# Patient Record
Sex: FEMALE | Race: WHITE
[De-identification: names, ages, dates, MRNs, and addresses within clinical notes are randomized per-mention and may not be internally consistent; named-entity substitution may affect disease eponyms.]

---

## 2017-12-06 ENCOUNTER — HOSPITAL ENCOUNTER (EMERGENCY)
Dept: HOSPITAL 10 - FTE | Age: 77
Discharge: HOME | End: 2017-12-06
Payer: MEDICARE

## 2017-12-06 VITALS — HEIGHT: 55 IN | WEIGHT: 150.31 LBS | BODY MASS INDEX: 34.79 KG/M2

## 2017-12-06 VITALS — SYSTOLIC BLOOD PRESSURE: 98 MMHG | RESPIRATION RATE: 20 BRPM | HEART RATE: 77 BPM | DIASTOLIC BLOOD PRESSURE: 50 MMHG

## 2017-12-06 DIAGNOSIS — I10: ICD-10-CM

## 2017-12-06 DIAGNOSIS — R19.7: Primary | ICD-10-CM

## 2017-12-06 DIAGNOSIS — Z79.82: ICD-10-CM

## 2017-12-06 DIAGNOSIS — J44.9: ICD-10-CM

## 2017-12-06 DIAGNOSIS — I50.9: ICD-10-CM

## 2017-12-06 DIAGNOSIS — J45.909: ICD-10-CM

## 2017-12-06 LAB
ADD UMIC: YES
ALBUMIN SERPL-MCNC: 3.8 G/DL (ref 3.3–4.9)
ALBUMIN/GLOB SERPL: 1.08 {RATIO}
ALP SERPL-CCNC: 79 IU/L (ref 42–121)
ALT SERPL-CCNC: 44 IU/L (ref 13–69)
ANION GAP SERPL CALC-SCNC: 14 MMOL/L (ref 8–16)
AST SERPL-CCNC: 24 IU/L (ref 15–46)
BASOPHILS # BLD AUTO: 0 10^3/UL (ref 0–0.1)
BASOPHILS NFR BLD: 0.3 % (ref 0–2)
BILIRUB DIRECT SERPL-MCNC: 0 MG/DL (ref 0–0.2)
BILIRUB SERPL-MCNC: 0.2 MG/DL (ref 0.2–1.3)
BUN SERPL-MCNC: 10 MG/DL (ref 7–20)
CALCIUM SERPL-MCNC: 6.7 MG/DL (ref 8.4–10.2)
CHLORIDE SERPL-SCNC: 104 MMOL/L (ref 97–110)
CO2 SERPL-SCNC: 28 MMOL/L (ref 21–31)
COLOR UR: (no result)
CREAT SERPL-MCNC: 0.46 MG/DL (ref 0.44–1)
EOSINOPHIL # BLD: 0.1 10^3/UL (ref 0–0.5)
EOSINOPHIL NFR BLD: 2.1 % (ref 0–7)
ERYTHROCYTE [DISTWIDTH] IN BLOOD BY AUTOMATED COUNT: 14 % (ref 11.5–14.5)
GLOBULIN SER-MCNC: 3.5 G/DL (ref 1.3–3.2)
GLUCOSE SERPL-MCNC: 100 MG/DL (ref 70–220)
GLUCOSE UR STRIP-MCNC: NEGATIVE MG/DL
HCT VFR BLD CALC: 36.3 % (ref 37–47)
HGB BLD-MCNC: 11.4 G/DL (ref 12–16)
KETONES UR STRIP.AUTO-MCNC: NEGATIVE MG/DL
LYMPHOCYTES # BLD AUTO: 2.1 10^3/UL (ref 0.8–2.9)
LYMPHOCYTES NFR BLD AUTO: 31.9 % (ref 15–51)
MCH RBC QN AUTO: 29.5 PG (ref 29–33)
MCHC RBC AUTO-ENTMCNC: 31.4 G/DL (ref 32–37)
MCV RBC AUTO: 93.8 FL (ref 82–101)
MONOCYTES # BLD: 0.6 10^3/UL (ref 0.3–0.9)
MONOCYTES NFR BLD: 8.7 % (ref 0–11)
NEUTROPHILS # BLD: 3.8 10^3/UL (ref 1.6–7.5)
NEUTROPHILS NFR BLD AUTO: 56.7 % (ref 39–77)
NITRITE UR QL STRIP.AUTO: NEGATIVE MG/DL
NRBC # BLD MANUAL: 0 10^3/UL (ref 0–0)
NRBC BLD AUTO-RTO: 0 /100WBC (ref 0–0)
PLATELET # BLD: 363 10^3/UL (ref 140–415)
PMV BLD AUTO: 10.3 FL (ref 7.4–10.4)
POTASSIUM SERPL-SCNC: 4 MMOL/L (ref 3.5–5.1)
PROT SERPL-MCNC: 7.3 G/DL (ref 6.1–8.1)
RBC # BLD AUTO: 3.87 10^6/UL (ref 4.2–5.4)
RBC # UR AUTO: (no result) MG/DL
SODIUM SERPL-SCNC: 142 MMOL/L (ref 135–144)
UR ASCORBIC ACID: NEGATIVE MG/DL
UR BILIRUBIN (DIP): NEGATIVE MG/DL
UR CLARITY: CLEAR
UR PH (DIP): 6 (ref 5–9)
UR RBC: 0 /HPF (ref 0–5)
UR SPECIFIC GRAVITY (DIP): 1 (ref 1–1.03)
UR TOTAL PROTEIN (DIP): NEGATIVE MG/DL
UROBILINOGEN UR STRIP-ACNC: NEGATIVE MG/DL
WBC # BLD AUTO: 6.7 10^3/UL (ref 4.8–10.8)
WBC # UR STRIP: NEGATIVE LEU/UL

## 2017-12-06 PROCEDURE — 85025 COMPLETE CBC W/AUTO DIFF WBC: CPT

## 2017-12-06 PROCEDURE — 81001 URINALYSIS AUTO W/SCOPE: CPT

## 2017-12-06 PROCEDURE — 83690 ASSAY OF LIPASE: CPT

## 2017-12-06 PROCEDURE — 87177 OVA AND PARASITES SMEARS: CPT

## 2017-12-06 PROCEDURE — 99284 EMERGENCY DEPT VISIT MOD MDM: CPT

## 2017-12-06 PROCEDURE — 80053 COMPREHEN METABOLIC PANEL: CPT

## 2017-12-06 PROCEDURE — 36415 COLL VENOUS BLD VENIPUNCTURE: CPT

## 2017-12-06 PROCEDURE — 87045 FECES CULTURE AEROBIC BACT: CPT

## 2017-12-06 NOTE — ERD
ER Documentation


Chief Complaint


Chief Complaint


diarrhea





HPI


77-year-old female presents with diarrhea for last 5 days.  Is intermittent and 

watery and is actually improving today.  There is no history of blood or mucus 

or vomiting or fevers.  She has some mild intermittent crampy left-sided 

abdominal pain.  There is no history of foreign travel or recent antibiotic use.





ROS


All systems reviewed and are negative except as per history of present illness.





Medications


Home Meds


Active Scripts


Bismuth Subsalicylate* (Pepto-Bismol*) 262 Mg/15 Ml Oral.susp, 15 ML PO Q3H Y 

for DIARRHEA for 4 Days, ML


   Prov:TULIO BERTRAND MD         12/6/17


Metoprolol Tartrate* (Lopressor*) 50 Mg Tab, 50 MG PO BID for 30 Days, TAB


   Prov:MUNA GONZALEZ MD         10/30/16


Reported Medications


Meloxicam* (Meloxicam*) 7.5 Mg/5 Ml Oral.susp, 15 MG PO DAILY, #300 ML


   10/27/16


Zolpidem Tartrate* (Zolpidem Tartrate*) 5 Mg Tablet, 5 MG PO QHS Y for INSOMNIA

, #30 TAB


   10/27/16


Potassium Bicarbonate-Citric Acid (Effer-K) 10 Meq Tablet.eff, 10 MEQ PO DAILY, 

TAB


   10/27/16


Amlodipine Besylate* (Norvasc*) 5 Mg Tablet, 5 MG PO DAILY, TAB


   10/27/16


Furosemide* (Furosemide*) 40 Mg Tablet, 40 MG PO DAILY, TAB


   10/27/16


Albuterol Sulfate* (Ventolin HFA*) 18 Gm Hfa.aer.ad, 2 PUFF INHALATION Q4H, #1 

INHALER


   10/27/16


Aspirin* (Aspirin* EC) 81 Mg Tablet.dr, 81 MG PO DAILY, TAB


   10/27/16


Dicyclomine Hcl* (Bentyl*) 10 Mg Capsule, 10 MG PO DAILY, CAP


   10/27/16


Meclizine Hcl* (Meclizine Hcl*) 25 Mg Tablet, 25 MG PO TID, TAB


   10/27/16


Ranitidine Hcl* (Ranitidine Hcl*) 150 Mg Tablet, 150 MG PO HS, #30 TAB


   10/27/16


Omeprazole* (Omeprazole*) 20 Mg Capsule.dr, 20 MG PO DAILY, #30 CAP


   10/27/16





Allergies


Allergies:  


Coded Allergies:  


     No Known Allergy (Unverified , 10/27/16)





PMhx/Soc


Medical and Surgical Hx:  pt denies Medical Hx, pt denies Surgical Hx


History of Surgery:  No


Anesthesia Reaction:  No


Hx Neurological Disorder:  No


Hx Respiratory Disorders:  Yes (asthma)


Hx Cardiac Disorders:  Yes (htn)


Hx Psychiatric Problems:  No


Hx Miscellaneous Medical Probl:  Yes (Asthma, COPD, CHF, HTN, gastritis)


Hx Alcohol Use:  No


Hx Substance Use:  No


Hx Tobacco Use:  No


Smoking Status:  Never smoker





Physical Exam


Vitals





Vital Signs








  Date Time  Temp Pulse Resp B/P Pulse Ox O2 Delivery O2 Flow Rate FiO2


 


12/6/17 08:54 97.0 90 20 137/63 97   








Physical Exam


Const:  [] Alert, non-ill-appearing.


Head:   Atraumatic 


Eyes:    Normal Conjunctiva


ENT:    Normal External Ears, Nose and Mouth.


Neck:               Full range of motion..~ No meningismus.


Resp:    Clear to auscultation bilaterally


Cardio:    Regular rate and rhythm, no murmurs


Abd:    Soft, non tender, non distended. Normal bowel sounds


Skin:    No petechiae or rashes


Back:    No midline or flank tenderness


Ext:    No cyanosis, or edema


Neur:    Awake and alert


Psych:    Normal Mood and Affect


Result Diagram:  


12/6/17 1045                                                                   

             12/6/17 1025





Results 24 hrs





 Laboratory Tests








Test


  12/6/17


10:25 12/6/17


10:45 12/6/17


11:30


 


Sodium Level 142mmol/L   


 


Potassium Level 4.0mmol/L   


 


Chloride Level 104mmol/L   


 


Carbon Dioxide Level 28mmol/L   


 


Anion Gap 14   


 


Blood Urea Nitrogen 10mg/dl   


 


Creatinine 0.46mg/dl   


 


Glucose Level 100mg/dl   


 


Calcium Level 6.7mg/dl   


 


Total Bilirubin 0.2mg/dl   


 


Direct Bilirubin 0.00mg/dl   


 


Indirect Bilirubin 0.2mg/dl   


 


Aspartate Amino Transf


(AST/SGOT) 24IU/L 


  


  


 


 


Alanine Aminotransferase


(ALT/SGPT) 44IU/L 


  


  


 


 


Alkaline Phosphatase 79IU/L   


 


Total Protein 7.3g/dl   


 


Albumin 3.8g/dl   


 


Globulin 3.50g/dl   


 


Albumin/Globulin Ratio 1.08   


 


Lipase 29U/L   


 


White Blood Count  6.710^3/ul  


 


Red Blood Count  3.8710^6/ul  


 


Hemoglobin  11.4g/dl  


 


Hematocrit  36.3%  


 


Mean Corpuscular Volume  93.8fl  


 


Mean Corpuscular Hemoglobin  29.5pg  


 


Mean Corpuscular Hemoglobin


Concent 


  31.4g/dl 


  


 


 


Red Cell Distribution Width  14.0%  


 


Platelet Count  00131^3/UL  


 


Mean Platelet Volume  10.3fl  


 


Neutrophils %  56.7%  


 


Lymphocytes %  31.9%  


 


Monocytes %  8.7%  


 


Eosinophils %  2.1%  


 


Basophils %  0.3%  


 


Nucleated Red Blood Cells %  0.0/100WBC  


 


Neutrophils #  3.810^3/ul  


 


Lymphocytes #  2.110^3/ul  


 


Monocytes #  0.610^3/ul  


 


Eosinophils #  0.110^3/ul  


 


Basophils #  0.010^3/ul  


 


Nucleated Red Blood Cells #  0.010^3/ul  


 


Urine Color   STRAW 


 


Urine Clarity   CLEAR 


 


Urine pH   6.0 


 


Urine Specific Gravity   1.004 


 


Urine Ketones   NEGATIVEmg/dL 


 


Urine Nitrite   NEGATIVEmg/dL 


 


Urine Bilirubin   NEGATIVEmg/dL 


 


Urine Urobilinogen   NEGATIVEmg/dL 


 


Urine Leukocyte Esterase   NEGATIVELeu/ul 


 


Urine Microscopic RBC   0/HPF 


 


Urine Microscopic WBC   0/HPF 


 


Urine Hemoglobin   1+mg/dL 


 


Urine Glucose   NEGATIVEmg/dL 


 


Urine Total Protein   NEGATIVEmg/dl 








 Current Medications








 Medications


  (Trade)  Dose


 Ordered  Sig/Shana


 Route


 PRN Reason  Start Time


 Stop Time Status Last Admin


Dose Admin


 


 Sodium Chloride


  (NS)  500 ml @ 


 500 mls/hr  Q1H STAT


 IV


   12/6/17 09:44


 12/6/17 10:43 DC 12/6/17 10:33


 











Procedures/MDM


Given the duration of diarrhea and IV was obtained.  Patient was given 500 cc 

normal saline IV.  Patient shows slight anemia at 11.7, otherwise no 

leukocytosis.  CMP shows no signs of dehydration or acidosis.  Patient was 

stable throughout ED course.  Urine shows hemoglobin but no leukocytes, 

nitrites or glucose.  Patient presents with diarrhea improving over the last 

week.  She may have viral gastroenteritis.  Stool was collected and sent for C. 

difficile, culture and ova and parasites.  I am recommending treatment with 

Pepto-Bismol and clear fluids and await stool studies prior to any additional 

treatment or interventions.  She has no signs of acute abdomen, obstruction, 

dehydration or electrolyte abnormalities.  The patient was stable with no new 

complaints during the ER course. Clinically, there is no current evidence to 

suggest meningitis, sepsis, acute abdomen, pneumonia, acute coronary syndrome, 

pulmonary embolism, or any other emergent condition appearing to require 

further evaluation or hospitalization. The patient should certainly return for 

any new or worsening symptoms per the aftercare instructions. They should 

otherwise follow-up with her primary care doctor for reevaluation this week.





Departure


Diagnosis:  


 Primary Impression:  


 Diarrhea


 Diarrhea type:  unspecified type  Qualified Code:  R19.7 - Diarrhea, 

unspecified type


Condition:  Stable


Patient Instructions:  Treating Diarrhea, Diarrhea, Unk Cause (Adult) Report 

Pendg





Additional Instructions:  


Studies for infection pending and should return in the next 2-4 days..  Drink 

clear fluids recheck for fevers, blood, new worsening symptoms.











TULIO BERTRAND MD Dec 6, 2017 12:20

## 2018-03-17 ENCOUNTER — HOSPITAL ENCOUNTER (INPATIENT)
Age: 78
LOS: 1 days | Discharge: LEFT BEFORE DISCHARGE | DRG: 309 | End: 2018-03-18

## 2018-03-17 ENCOUNTER — HOSPITAL ENCOUNTER (INPATIENT)
Dept: HOSPITAL 91 - E/R | Age: 78
LOS: 1 days | Discharge: LEFT BEFORE DISCHARGE | DRG: 309 | End: 2018-03-18
Payer: MEDICARE

## 2018-03-17 DIAGNOSIS — C85.80: ICD-10-CM

## 2018-03-17 DIAGNOSIS — I47.1: Primary | ICD-10-CM

## 2018-03-17 DIAGNOSIS — I10: ICD-10-CM

## 2018-03-17 DIAGNOSIS — E86.0: ICD-10-CM

## 2018-03-17 DIAGNOSIS — D63.8: ICD-10-CM

## 2018-03-17 DIAGNOSIS — J45.909: ICD-10-CM

## 2018-03-17 DIAGNOSIS — R50.9: ICD-10-CM

## 2018-03-17 LAB
ADD MAN DIFF?: NO
ADD UMIC: YES
ALANINE AMINOTRANSFERASE: 37 IU/L (ref 13–69)
ALBUMIN/GLOBULIN RATIO: 1.03
ALBUMIN: 3 G/DL (ref 3.3–4.9)
ALKALINE PHOSPHATASE: 51 IU/L (ref 42–121)
ANION GAP: 16 (ref 8–16)
ASPARTATE AMINO TRANSFERASE: 49 IU/L (ref 15–46)
BASOPHIL #: 0 10^3/UL (ref 0–0.1)
BASOPHILS %: 0.3 % (ref 0–2)
BILIRUBIN,DIRECT: 0 MG/DL (ref 0–0.2)
BILIRUBIN,TOTAL: 0.1 MG/DL (ref 0.2–1.3)
BLOOD UREA NITROGEN: 11 MG/DL (ref 7–20)
CALCIUM: 7.6 MG/DL (ref 8.4–10.2)
CARBON DIOXIDE: 24 MMOL/L (ref 21–31)
CHLORIDE: 100 MMOL/L (ref 97–110)
CREATININE: 0.52 MG/DL (ref 0.44–1)
EOSINOPHILS #: 0 10^3/UL (ref 0–0.5)
EOSINOPHILS %: 0 % (ref 0–7)
GLOBULIN: 2.9 G/DL (ref 1.3–3.2)
GLUCOSE: 111 MG/DL (ref 70–220)
HEMATOCRIT: 29.1 % (ref 37–47)
HEMOGLOBIN: 9.6 G/DL (ref 12–16)
INR: 1.14
LACTIC ACID: 0.9 MMOL/L (ref 0.5–2)
LACTIC ACID: 2.2 MMOL/L (ref 0.5–2)
LIPASE: 156 U/L (ref 23–300)
LYMPHOCYTES #: 0.7 10^3/UL (ref 0.8–2.9)
LYMPHOCYTES %: 10 % (ref 15–51)
MEAN CORPUSCULAR HEMOGLOBIN: 27.7 PG (ref 29–33)
MEAN CORPUSCULAR HGB CONC: 33 G/DL (ref 32–37)
MEAN CORPUSCULAR VOLUME: 83.9 FL (ref 82–101)
MEAN PLATELET VOLUME: 11.7 FL (ref 7.4–10.4)
MONOCYTE #: 0.2 10^3/UL (ref 0.3–0.9)
MONOCYTES %: 3.3 % (ref 0–11)
NEUTROPHIL #: 5.6 10^3/UL (ref 1.6–7.5)
NEUTROPHILS %: 85.9 % (ref 39–77)
NUCLEATED RED BLOOD CELLS #: 0 10^3/UL (ref 0–0)
NUCLEATED RED BLOOD CELLS%: 0 /100WBC (ref 0–0)
PARTIAL THROMBOPLASTIN TIME: 32.4 SEC (ref 25–35)
PLATELET COUNT: 530 10^3/UL (ref 140–415)
POTASSIUM: 3.5 MMOL/L (ref 3.5–5.1)
PROTIME: 14.8 SEC (ref 11.9–14.9)
PT RATIO: 1.2
RED BLOOD COUNT: 3.47 10^6/UL (ref 4.2–5.4)
RED CELL DISTRIBUTION WIDTH: 17.9 % (ref 11.5–14.5)
SODIUM: 136 MMOL/L (ref 135–144)
TOTAL PROTEIN: 5.9 G/DL (ref 6.1–8.1)
TROPONIN-I: 0.02 NG/ML (ref 0–0.12)
UR ASCORBIC ACID: NEGATIVE MG/DL
UR BACTERIA: (no result) /HPF
UR BILIRUBIN (DIP): NEGATIVE MG/DL
UR BLOOD (DIP): (no result) MG/DL
UR CLARITY: CLEAR
UR COLOR: COLORLESS
UR GLUCOSE (DIP): NEGATIVE MG/DL
UR KETONES (DIP): NEGATIVE MG/DL
UR LEUKOCYTE ESTERASE (DIP): NEGATIVE LEU/UL
UR NITRITE (DIP): NEGATIVE MG/DL
UR PH (DIP): 7 (ref 5–9)
UR RBC: 1 /HPF (ref 0–5)
UR SPECIFIC GRAVITY (DIP): 1 (ref 1–1.03)
UR TOTAL PROTEIN (DIP): NEGATIVE MG/DL
UR UROBILINOGEN (DIP): NEGATIVE MG/DL
UR WBC: 1 /HPF (ref 0–5)
URINE BLOOD (DIP) POC: (no result)
URINE PH (DIP) POC: 5.5 (ref 5–8.5)
WHITE BLOOD COUNT: 6.6 10^3/UL (ref 4.8–10.8)

## 2018-03-17 PROCEDURE — 82553 CREATINE MB FRACTION: CPT

## 2018-03-17 PROCEDURE — 71045 X-RAY EXAM CHEST 1 VIEW: CPT

## 2018-03-17 PROCEDURE — 87086 URINE CULTURE/COLONY COUNT: CPT

## 2018-03-17 PROCEDURE — 85025 COMPLETE CBC W/AUTO DIFF WBC: CPT

## 2018-03-17 PROCEDURE — 36415 COLL VENOUS BLD VENIPUNCTURE: CPT

## 2018-03-17 PROCEDURE — 81003 URINALYSIS AUTO W/O SCOPE: CPT

## 2018-03-17 PROCEDURE — 81001 URINALYSIS AUTO W/SCOPE: CPT

## 2018-03-17 PROCEDURE — 80053 COMPREHEN METABOLIC PANEL: CPT

## 2018-03-17 PROCEDURE — 85730 THROMBOPLASTIN TIME PARTIAL: CPT

## 2018-03-17 PROCEDURE — 87400 INFLUENZA A/B EACH AG IA: CPT

## 2018-03-17 PROCEDURE — 99285 EMERGENCY DEPT VISIT HI MDM: CPT

## 2018-03-17 PROCEDURE — 87040 BLOOD CULTURE FOR BACTERIA: CPT

## 2018-03-17 PROCEDURE — 93005 ELECTROCARDIOGRAM TRACING: CPT

## 2018-03-17 PROCEDURE — 96374 THER/PROPH/DIAG INJ IV PUSH: CPT

## 2018-03-17 PROCEDURE — 85610 PROTHROMBIN TIME: CPT

## 2018-03-17 PROCEDURE — 82550 ASSAY OF CK (CPK): CPT

## 2018-03-17 PROCEDURE — 83690 ASSAY OF LIPASE: CPT

## 2018-03-17 PROCEDURE — 83605 ASSAY OF LACTIC ACID: CPT

## 2018-03-17 PROCEDURE — 84484 ASSAY OF TROPONIN QUANT: CPT

## 2018-03-17 RX ADMIN — CEFTRIAXONE 1 MLS/HR: 1 INJECTION, SOLUTION INTRAVENOUS at 19:29

## 2018-03-17 RX ADMIN — METOPROLOL TARTRATE 1 MG: 25 TABLET ORAL at 20:08

## 2018-03-17 RX ADMIN — METOPROLOL TARTRATE 1 MG: 50 TABLET, FILM COATED ORAL at 20:05

## 2018-03-17 RX ADMIN — THIAMINE HYDROCHLORIDE 1 ML: 100 INJECTION, SOLUTION INTRAMUSCULAR; INTRAVENOUS at 18:03

## 2018-03-17 RX ADMIN — IBUPROFEN 1 MG: 600 TABLET ORAL at 18:34

## 2018-03-18 LAB
CK INDEX: 2
CK-MB: 0.81 NG/ML (ref 0–2.4)
CREATINE KINASE: 40 IU/L (ref 23–200)
LACTIC ACID: 0.9 MMOL/L (ref 0.5–2)
TROPONIN-I: 0.01 NG/ML (ref 0–0.12)

## 2018-03-18 RX ADMIN — CEFEPIME 1 MLS/HR: 1 INJECTION, POWDER, FOR SOLUTION INTRAMUSCULAR; INTRAVENOUS at 09:40

## 2018-03-18 RX ADMIN — ONDANSETRON HYDROCHLORIDE 1 MG: 2 INJECTION, SOLUTION INTRAMUSCULAR; INTRAVENOUS at 05:25

## 2018-03-18 RX ADMIN — ENOXAPARIN SODIUM 1 MG: 100 INJECTION SUBCUTANEOUS at 09:00

## 2018-03-18 RX ADMIN — THIAMINE HYDROCHLORIDE 1 MLS/HR: 100 INJECTION, SOLUTION INTRAMUSCULAR; INTRAVENOUS at 00:40

## 2018-03-18 RX ADMIN — ACETAMINOPHEN 1 MG: 325 TABLET, FILM COATED ORAL at 11:12

## 2018-03-18 RX ADMIN — ASPIRIN 1 MG: 81 TABLET, COATED ORAL at 09:00

## 2018-03-18 RX ADMIN — ASPIRIN 1 MG: 325 TABLET, DELAYED RELEASE ORAL at 01:02

## 2018-03-18 RX ADMIN — ONDANSETRON HYDROCHLORIDE 1 MG: 2 INJECTION, SOLUTION INTRAMUSCULAR; INTRAVENOUS at 12:20

## 2018-03-18 RX ADMIN — SODIUM CHLORIDE 1 MLS/HR: 234 INJECTION INTRAMUSCULAR; INTRAVENOUS; SUBCUTANEOUS at 06:25

## 2018-03-18 RX ADMIN — DICYCLOMINE HYDROCHLORIDE 1 MG: 10 CAPSULE ORAL at 09:00

## 2018-03-18 RX ADMIN — THIAMINE HYDROCHLORIDE 1 MLS/HR: 100 INJECTION, SOLUTION INTRAMUSCULAR; INTRAVENOUS at 06:02

## 2018-03-18 RX ADMIN — PANTOPRAZOLE SODIUM 1 MG: 40 TABLET, DELAYED RELEASE ORAL at 05:25

## 2018-03-18 RX ADMIN — THIAMINE HYDROCHLORIDE 1 MLS/HR: 100 INJECTION, SOLUTION INTRAMUSCULAR; INTRAVENOUS at 10:26

## 2018-03-18 RX ADMIN — ONDANSETRON HYDROCHLORIDE 1 MG: 2 INJECTION, SOLUTION INTRAMUSCULAR; INTRAVENOUS at 00:39

## 2018-03-27 ENCOUNTER — HOSPITAL ENCOUNTER (EMERGENCY)
Age: 78
End: 2018-03-27

## 2018-03-27 ENCOUNTER — HOSPITAL ENCOUNTER (EMERGENCY)
Dept: HOSPITAL 91 - E/R | Age: 78
End: 2018-03-27
Payer: MEDICARE

## 2018-03-27 DIAGNOSIS — I46.9: Primary | ICD-10-CM

## 2018-03-27 DIAGNOSIS — Z79.82: ICD-10-CM

## 2018-03-27 DIAGNOSIS — J45.909: ICD-10-CM

## 2018-03-27 PROCEDURE — 99291 CRITICAL CARE FIRST HOUR: CPT

## 2018-03-27 PROCEDURE — 71045 X-RAY EXAM CHEST 1 VIEW: CPT

## 2018-03-27 PROCEDURE — 31500 INSERT EMERGENCY AIRWAY: CPT

## 2018-03-27 PROCEDURE — 92950 HEART/LUNG RESUSCITATION CPR: CPT

## 2018-03-27 PROCEDURE — 94002 VENT MGMT INPAT INIT DAY: CPT

## 2018-03-27 RX ADMIN — THIAMINE HYDROCHLORIDE 1 MLS/HR: 100 INJECTION, SOLUTION INTRAMUSCULAR; INTRAVENOUS at 15:56

## 2018-03-27 RX ADMIN — THIAMINE HYDROCHLORIDE 1 MLS/HR: 100 INJECTION, SOLUTION INTRAMUSCULAR; INTRAVENOUS at 15:57
